# Patient Record
Sex: MALE | Race: BLACK OR AFRICAN AMERICAN | NOT HISPANIC OR LATINO | Employment: UNEMPLOYED | URBAN - METROPOLITAN AREA
[De-identification: names, ages, dates, MRNs, and addresses within clinical notes are randomized per-mention and may not be internally consistent; named-entity substitution may affect disease eponyms.]

---

## 2022-05-31 ENCOUNTER — OFFICE VISIT (OUTPATIENT)
Dept: PODIATRY | Facility: CLINIC | Age: 42
End: 2022-05-31
Payer: MEDICARE

## 2022-05-31 VITALS — SYSTOLIC BLOOD PRESSURE: 124 MMHG | HEART RATE: 70 BPM | HEIGHT: 71 IN | DIASTOLIC BLOOD PRESSURE: 84 MMHG

## 2022-05-31 DIAGNOSIS — B35.3 TINEA PEDIS OF BOTH FEET: ICD-10-CM

## 2022-05-31 DIAGNOSIS — M72.2 PLANTAR FASCIITIS: Primary | ICD-10-CM

## 2022-05-31 DIAGNOSIS — B35.1 ONYCHOMYCOSIS: ICD-10-CM

## 2022-05-31 PROCEDURE — 99203 OFFICE O/P NEW LOW 30 MIN: CPT | Performed by: PODIATRIST

## 2022-05-31 RX ORDER — HYDROCHLOROTHIAZIDE 12.5 MG/1
12.5 TABLET ORAL DAILY
COMMUNITY
Start: 2022-05-21

## 2022-05-31 NOTE — PROGRESS NOTES
PATIENT:  Dania Coleman  1980       ASSESSMENT:     1  Plantar fasciitis  Ambulatory referral to Physical Therapy    P F  Night Splint   2  Tinea pedis of both feet  econazole nitrate 1 % cream   3  Onychomycosis               PLAN:  1  Reviewed medical records and labs  Patient was counseled and educated on the condition and the diagnosis  2  The exam and symptoms are consistent with right plantar fasciitis  He also has mild onychomycosis and tinea pedis  The diagnosis, treatment options and prognosis were discussed with the patient  3  Referred him to PT/OT  Sent him for night splint  4  Instructed supportive care, home exercise, icing, and proper footwear/ arch support  Discussed possible images depending on the progress  5  Continue Penlac for now  Econazole for tinea pedis  6  Patient will return in 2 months for re-evaluation  Subjective:       HPI  The patient presents for second opinion  He has nail fungus and athlete's foot  He had if for a few months  He is on Penlac since April  He was also instructed to use OTC spray of skin without resolving it  He also complained of pain in right heel for about 1 month  The patient has significant post-static dyskinesia in the morning  Pain also increases with walking and standing  The patient does not recall any injury, but reported some overuse  The patient tried OTC meds, and different shoes without relieving the pain  Denied any swelling  No associated numbness or paresthesia  No significant weakness  The following portions of the patient's history were reviewed and updated as appropriate: allergies, current medications, past family history, past medical history, past social history, past surgical history and problem list   All pertinent labs and images were reviewed        Past Medical History  Past Medical History:   Diagnosis Date    High blood pressure        Past Surgical History  Past Surgical History:   Procedure Laterality Date    BACK SURGERY      05/2022        Allergies:  Cat hair extract    Medications:  Current Outpatient Medications   Medication Sig Dispense Refill    ciclopirox (PENLAC) 8 % solution       econazole nitrate 1 % cream Apply topically daily 85 g 3    hydrochlorothiazide (HYDRODIURIL) 12 5 mg tablet Take 12 5 mg by mouth daily       No current facility-administered medications for this visit  Social History:  Social History     Socioeconomic History    Marital status: Single     Spouse name: None    Number of children: None    Years of education: None    Highest education level: None   Occupational History    None   Tobacco Use    Smoking status: Current Every Day Smoker     Packs/day: 0 25     Years: 10 00     Pack years: 2 50     Types: Cigarettes    Smokeless tobacco: Never Used   Vaping Use    Vaping Use: Never used   Substance and Sexual Activity    Alcohol use: Yes     Alcohol/week: 1 0 standard drink     Types: 1 Shots of liquor per week    Drug use: Yes     Types: Marijuana    Sexual activity: Yes     Partners: Female   Other Topics Concern    None   Social History Narrative    None     Social Determinants of Health     Financial Resource Strain: Not on file   Food Insecurity: Not on file   Transportation Needs: Not on file   Physical Activity: Not on file   Stress: Not on file   Social Connections: Not on file   Intimate Partner Violence: Not on file   Housing Stability: Not on file          Review of Systems   Constitutional: Negative for appetite change, chills and fever  HENT: Negative for sore throat  Respiratory: Negative for cough and shortness of breath  Cardiovascular: Negative for chest pain  Gastrointestinal: Negative for diarrhea, nausea and vomiting  Musculoskeletal: Negative for gait problem  Skin: Negative for rash and wound  Allergic/Immunologic: Negative for immunocompromised state     Neurological: Negative for weakness and numbness  Hematological: Negative  Psychiatric/Behavioral: Negative for behavioral problems and confusion  Objective:      /84   Pulse 70   Ht 5' 11" (1 803 m) Comment: verbal         Physical Exam  Vitals reviewed  Constitutional:       General: He is not in acute distress  Appearance: He is not ill-appearing or toxic-appearing  HENT:      Head: Normocephalic and atraumatic  Eyes:      Extraocular Movements: Extraocular movements intact  Cardiovascular:      Rate and Rhythm: Normal rate and regular rhythm  Pulses: Normal pulses  Dorsalis pedis pulses are 2+ on the right side and 2+ on the left side  Posterior tibial pulses are 2+ on the right side and 2+ on the left side  Pulmonary:      Effort: Pulmonary effort is normal  No respiratory distress  Musculoskeletal:         General: Tenderness present  No swelling or signs of injury  Cervical back: Normal range of motion and neck supple  Right lower leg: No edema  Left lower leg: No edema  Right foot: No foot drop  Left foot: No foot drop  Comments: Focal pain right plantar medial heel  No edema  No pain on lateral compression of right heel  Skin:     General: Skin is warm  Capillary Refill: Capillary refill takes less than 2 seconds  Coloration: Skin is not cyanotic or mottled  Findings: No abscess, ecchymosis or wound  Nails: There is no clubbing  Comments: Mild discoloration of distal toenails great toes  Mild skin peeling around toes  Maceration 3rd and 4th interspace bilaterally  Neurological:      General: No focal deficit present  Mental Status: He is alert and oriented to person, place, and time  Cranial Nerves: No cranial nerve deficit  Sensory: No sensory deficit  Motor: No weakness        Coordination: Coordination normal    Psychiatric:         Mood and Affect: Mood normal          Behavior: Behavior normal          Thought Content:  Thought content normal          Judgment: Judgment normal

## 2022-05-31 NOTE — PATIENT INSTRUCTIONS
Plantar Fasciitis Exercises   AMBULATORY CARE:   What you need to know about plantar fasciitis exercises:  Plantar fasciitis exercises help stretch your plantar fascia, calf muscles, and Achilles tendon  They also help strengthen the muscles that support your heel and foot  Exercises and stretching can help prevent plantar fasciitis from getting worse or coming back  Contact your healthcare provider if:   Your pain and swelling increase  You develop new knee, hip, or back pain  You have questions or concerns about your condition or care  How to do plantar fasciitis exercises:  Ask your healthcare provider when to start these exercises and how often to do them  Slant board stretch:  Stand on a slanted board with your toes higher than your heel  Press your heel into the board  Keep your knee slightly bent  Hold this position for 1 minute  Repeat 5 times  Heel stretch:  Stand up straight with your hands on a wall  Place your injured leg slightly behind your other leg  Keep your heels flat on the floor, lean forward, and bend both knees  Hold for 30 seconds  Calf stretch:  Stand up straight with your hands on a wall  Step forward so that your uninjured foot is in front of your injured foot  Keep your front leg bent and your back leg straight  Gently lean forward until you feel your calf stretch  Hold for 30 seconds and then relax  Seated plantar fascia stretch:  Sit on a firm surface, such as the floor or a mat  Extend your legs out in front of you  Raise your injured foot a few inches off the ground  Keep your leg straight  Grab the toes of your injured foot and pull them toward you  With your other hand, feel your plantar fascia  You should feel it press outward  Hold for 30 seconds  If you cannot reach your toes, loop a towel or tie around your foot  Gently pull on the towel or tie and flex your toes toward you  Heel raises:  Stand on the injured leg   Raise your other leg off the ground  Hold onto a railing or wall for balance  Slowly rise up on the toes of your injured leg  Hold for 5 seconds  Slowly lower your heel to the ground  Toe curls:  Place a towel on the floor  Put your foot flat on the towel  Grab the towel with your toes by curling them around the towel  Lift the towel up with your toes  Toe taps:  Sit down and place your foot flat on the floor  Keep your heel on the floor  Point all your toes up toward the ceiling  While the 4 smaller toes are pointed up, bend your big toe down and tap it on the ground  Do 10 to 50 taps  Point all 5 toes up toward the ceiling again  This time keep your big toe pointed up and tap the 4 smaller toes on the ground  Do 10 to 50 taps each time  Follow up with your doctor as directed:  Write down your questions so you remember to ask them during your visits  © Copyright [employer] 2022 Information is for End User's use only and may not be sold, redistributed or otherwise used for commercial purposes  All illustrations and images included in CareNotes® are the copyrighted property of [employer]  or [facility] Som Hernandez   The above information is an  only  It is not intended as medical advice for individual conditions or treatments  Talk to your doctor, nurse or pharmacist before following any medical regimen to see if it is safe and effective for you

## 2022-06-13 ENCOUNTER — EVALUATION (OUTPATIENT)
Dept: PHYSICAL THERAPY | Facility: CLINIC | Age: 42
End: 2022-06-13
Payer: MEDICARE

## 2022-06-13 DIAGNOSIS — M72.2 PLANTAR FASCIITIS: ICD-10-CM

## 2022-06-13 PROCEDURE — 97162 PT EVAL MOD COMPLEX 30 MIN: CPT | Performed by: PHYSICAL THERAPIST

## 2022-06-13 NOTE — PROGRESS NOTES
PT Evaluation     Today's date: 2022  Patient name: Earlene Magana  : 1980  MRN: [id number]  Referring provider: Clolin Gilmore DPM  Dx:   Encounter Diagnosis     ICD-10-CM    1  Plantar fasciitis  M72 2 Ambulatory referral to Physical Therapy              Assessment  Assessment details: Pt is a 42y o  year old male presenting to physical therapy for Plantar fasciitis  He present with the following impairments; weakness in right ankle DF, TTP and pain in right medial calcaneous, mid-plantar aspect, and plantar fascia, pronation in left ankle and supination in right ankle during ambulation, pain with passive inversion, inability to heel walk due to pain in right ankle,  poor squatting mechanics, and pain with in heel during active movements affecting his function with walking, standing, jumping, cutting, planting, navigating stairs, squatting, heel raises, toe/heel walking, and single leg stance  Pt will benefit from skilled physical therapy to address functional limitations noted in evaluation and meet patient goals  Impairments: abnormal gait, abnormal or restricted ROM, impaired physical strength, lacks appropriate home exercise program, pain with function and weight-bearing intolerance    Goals  ST: Pt will be able to complete 17 heel raises in 2-4 weeks to show improved strength in right ankle  2: Pt will be able to walk 25 feet on heels to show improved tolerance and decreased pain in right heel in 2-4 weeks  LT: Pt will be able to complete 25 heel raises on his right ankle to show increased DF strength, to improve with jumping when playing basketball in 6-8   2: Pt will be able to complete a 2 hour basketball game with no pain in his right heel in 6-8 weeks     3: Pt will be I w HEP    Plan  Patient would benefit from: skilled physical therapy  Planned modality interventions: manual electrical stimulation, cryotherapy, microcurrent electrical stimulation, TENS, thermotherapy: hydrocollator packs, electrical stimulation/Mongolian stimulation and unattended electrical stimulation  Planned therapy interventions: joint mobilization, manual therapy, massage, Damon taping, ADL training, abdominal trunk stabilization, balance, balance/weight bearing training, neuromuscular re-education, muscle pump exercises, therapeutic exercise, therapeutic activities, strengthening, stretching, therapeutic training, home exercise program, graded exercise, graded activity, gait training, flexibility and functional ROM exercises  Frequency: 1x week  Duration in weeks: 6        Subjective Evaluation    History of Present Illness  Mechanism of injury: Pt reports pain in his right foot in his heel when standing  The pain started in his heel a month ago with No TRIXIE  Pt reports pain every time when he walks, stands, jumps, plants, cut, and navigating stairs and that the pain is never not there  Pt reports that pain is worse when he first wakes up  Massage makes it feel better, but takes no medications or heat/ ice for the pain  Never radiates up or has LBP  Pt reports that his biggest goal is getting back to playing basketball and walking/standing w/out pain  Pt is currently not working  TTP on right heel which is the only place he reports pain  Pain  Current pain rating: 3  At best pain rating: 3  At worst pain rating: 10          Objective     Tenderness     Right Ankle/Foot   Tenderness in the medial calcaneus, mid-plantar aspect and plantar fascia  No tenderness in the anterior ankle, dorsum foot, lateral malleolus and medial malleolus       Active Range of Motion   Left Ankle/Foot   Dorsiflexion (ke): 18 degrees   Plantar flexion: 40 degrees   Inversion: 35 degrees   Eversion: 21 degrees     Right Ankle/Foot   Dorsiflexion (ke): 20 degrees   Plantar flexion: 37 degrees   Inversion: 40 degrees   Eversion: 25 degrees     Passive Range of Motion     Right Ankle/Foot    Eversion: WFL and with pain    Strength/Myotome Testing     Left Ankle/Foot   Dorsiflexion: 5  Plantar flexion: 5  Inversion: 5  Eversion: 5    Right Ankle/Foot   Dorsiflexion: 5  Plantar flexion: 5  Inversion: 4+  Eversion: 5    Additional Strength Details  Left foot pronated and right foot supinated during ambulation  Toe walking on R LE is weak and painful  Unable to heel walk due to pain in R heel  30secs b/l SLS EO    25 SL calf raises on L  10 SL calf raises on R     Evaluation supervised by Yecenia HOLGUIN         Precautions: N/A      Date 6/13            Visit # 1            FOTO 67/81             Re-eval IE              Manuals 6/13            Plantar Fascia IASTM PK            Gastroc Strech                                       Neuro Re-Ed 6/13            SLS             Biodex             Airex             Slantboard             LAQ             PT Education PK                         Ther Ex 6/13            Bike             Heel Raises 2x10            SL Heel Raises 2x10 ea            Leg Press             Calf Press             Gastroc Stre 3x30'            Ankle 3 Way 10x5' ea                                      Ther Activity 6/13            Sqatting             Lateral Walking             Gait Training 6/13                                      Modalities 6/13

## 2022-06-17 ENCOUNTER — APPOINTMENT (OUTPATIENT)
Dept: PHYSICAL THERAPY | Facility: CLINIC | Age: 42
End: 2022-06-17
Payer: MEDICARE

## 2022-06-24 ENCOUNTER — OFFICE VISIT (OUTPATIENT)
Dept: PHYSICAL THERAPY | Facility: CLINIC | Age: 42
End: 2022-06-24
Payer: MEDICARE

## 2022-06-24 DIAGNOSIS — M72.2 PLANTAR FASCIITIS: Primary | ICD-10-CM

## 2022-06-24 PROCEDURE — 97112 NEUROMUSCULAR REEDUCATION: CPT | Performed by: PHYSICAL THERAPIST

## 2022-06-24 PROCEDURE — 97110 THERAPEUTIC EXERCISES: CPT | Performed by: PHYSICAL THERAPIST

## 2022-06-24 NOTE — PROGRESS NOTES
Daily Note     Today's date: 2022  Patient name: Martinez Ponce  : 1980  MRN: [id number]  Referring provider: Raffi Pruitt DPM  Dx:   Encounter Diagnosis     ICD-10-CM    1  Plantar fasciitis  M72 2                   Subjective: Pt reports that his foot is doing better since last visit  Objective: See treatment diary below      Assessment: Pt required the use of 2 finger support on the bar for R SLS on airex at the mirror, but was able to maintain balance the whole time  He has improved tolerance to SLS w minimal pain present  Pt was educated on proper squatting form and depth by SPT, and was able to [facility] proper form throughout exercise  Pt performed leg press with relative ease and advanced from 105# to 155#, continue to advance weight as pt tolerates NV  Patient demonstrated fatigue post treatment, exhibited good technique with therapeutic exercises and would benefit from continued PT  Plan: Continue per plan of care  Progress treatment as tolerated  Treatment supervised by Brian HOLGIUN       Precautions: N/A      Date            Visit # 1 2           FOTO              Re-eval IE              Manuals            Plantar Fascia IASTM PK PK           Gastroc Strech                                       Neuro Re-Ed            SLS  3x30' Airex           Biodex             Airex             Slantboard  3x30'           LAQ             PT Education PK            Rockerboard  30x A/P + S/S           Ther Ex            Bike  10'           Heel Raises 2x10 25x on step           SL Heel Raises 2x10 ea 20x ea           Leg Press  10x 105# 125# 155#/ RSL #75           Calf Press  20x 55# RSL           Gastroc Stre 3x30'            Ankle 3 Way 10x5' ea 15x ea GTB           Tennis [place]  2'                        Ther Activity            Sqatting  2x10           Lateral Walking             Gait Training  Modalities 6/13

## 2022-07-01 ENCOUNTER — OFFICE VISIT (OUTPATIENT)
Dept: PHYSICAL THERAPY | Facility: CLINIC | Age: 42
End: 2022-07-01
Payer: MEDICARE

## 2022-07-01 DIAGNOSIS — M72.2 PLANTAR FASCIITIS: Primary | ICD-10-CM

## 2022-07-01 PROCEDURE — 97110 THERAPEUTIC EXERCISES: CPT | Performed by: PHYSICAL THERAPIST

## 2022-07-01 PROCEDURE — 97112 NEUROMUSCULAR REEDUCATION: CPT | Performed by: PHYSICAL THERAPIST

## 2022-07-01 NOTE — PROGRESS NOTES
Daily Note     Today's date: 2022  Patient name: Tone Kinsey  : 1980  MRN: [id number]  Referring provider: Lucie Gomes DPM  Dx:   Encounter Diagnosis     ICD-10-CM    1  Plantar fasciitis  M72 2                   Subjective: Pt reports that his foot is doing better since last visit  Objective: See treatment diary below      Assessment: Pt was able to balance himself on airex with SLS for the whole time without the use of UE, except for 1 time in the 3 sets to catch himself  Pt completed 165# on leg press with good form and relative ease, advance to 185# NV  Pt had more tightness in his more distal medial portion of his right plantar fasicia today than last visit, but after IASTM was performed it was alleviated  Patient demonstrated fatigue post treatment, exhibited good technique with therapeutic exercises and would benefit from continued PT      Plan: Continue per plan of care  Progress treatment as tolerated         Precautions: N/A      Date           Visit # 1 2 3          FOTO              Re-eval IE              Manuals           Plantar Fascia IASTM PK PK PK          Gastroc Strech                                       Neuro Re-Ed           SLS  3x30' Airex 3x30' Airex          Biodex             Airex             Slantboard  3x30' 3x30'          LAQ   30x 4#          PT Education PK  PK          Rockerboard  30x A/P + S/S 30x A/P + S/S          Ther Ex           Bike  10' 10'          Heel Raises 2x10 25x on step 30x 1R          SL Heel Raises 2x10 ea 20x ea 25x ea          Leg Press  10x 105# 125# 155#/ RSL #75 30x 165#/ 75# RSL          Calf Press  20x 55# RSL 30x 55# RSL          Gastroc Stre 3x30'            Ankle 3 Way 10x5' ea 15x ea GTB 30x ea BTB          Tennis [place]  2' 2'                       Ther Activity           Sqatting  2x10 2x10          Lateral Walking             Gait Training  Modalities 6/13

## 2022-07-08 ENCOUNTER — OFFICE VISIT (OUTPATIENT)
Dept: PHYSICAL THERAPY | Facility: CLINIC | Age: 42
End: 2022-07-08
Payer: MEDICARE

## 2022-07-08 DIAGNOSIS — M72.2 PLANTAR FASCIITIS: Primary | ICD-10-CM

## 2022-07-08 PROCEDURE — 97112 NEUROMUSCULAR REEDUCATION: CPT | Performed by: PHYSICAL THERAPIST

## 2022-07-08 PROCEDURE — 97110 THERAPEUTIC EXERCISES: CPT | Performed by: PHYSICAL THERAPIST

## 2022-07-08 NOTE — PROGRESS NOTES
Discharge Note     Today's date: 2022  Patient name: Fernando Loving  : 1980  MRN: [id number]  Referring provider: Dayna Salazar DPM  Dx:   Encounter Diagnosis     ICD-10-CM    1  Plantar fasciitis  M72 2                   Subjective: Pt reports no pain in the past few days  Objective: See treatment diary below      Assessment: Pt does well w current program and is challenged w calf strengthening activities and rockerboard  He has some TTP along medial calcaneal tubercle w IASTM  Patient is I w HEP and is being [address] w HEP  Plan: DC w HEP       Precautions: N/A      Date          Visit # 1 2 3 4         FOTO 67   80/81          Re-eval IE              Manuals          Plantar Fascia IASTM PK PK PK SF         Gastroc Strech                                       Neuro Re-Ed  7         SLS  3x30' Airex 3x30' Airex 3x30' Airex         Biodex             Airex             Slantboard  3x30' 3x30' 3x30"         LAQ   30x 4#          PT Education PK  PK SF         Rockerboard  30x A/P + S/S 30x A/P + S/S 30x A/P SL         Ther Ex          Bike  10' 10' 8'         Heel Raises 2x10 25x on step 30x 1R 30x 1R SL         SL Heel Raises 2x10 ea 20x ea 25x ea          Leg Press  10x 105# 125# 155#/ RSL #75 30x 165#/ 75# RSL 30x 165#/ 75# RSL         Calf Press  20x 55# RSL 30x 55# RSL 30x 55# RSL         Gastroc Stre 3x30'   3x30"         Ankle 3 Way 10x5' ea 15x ea GTB 30x ea BTB          Tennis [place]  2' 2'                       Ther Activity  7         Sqatting  2x10 2x10          Lateral Walking             Gait Training                                       Modalities

## 2022-09-06 ENCOUNTER — OFFICE VISIT (OUTPATIENT)
Dept: PODIATRY | Facility: CLINIC | Age: 42
End: 2022-09-06
Payer: MEDICARE

## 2022-09-06 VITALS
HEIGHT: 71 IN | HEART RATE: 54 BPM | DIASTOLIC BLOOD PRESSURE: 82 MMHG | WEIGHT: 237.8 LBS | SYSTOLIC BLOOD PRESSURE: 132 MMHG | BODY MASS INDEX: 33.29 KG/M2

## 2022-09-06 DIAGNOSIS — B35.1 ONYCHOMYCOSIS: ICD-10-CM

## 2022-09-06 DIAGNOSIS — B35.3 TINEA PEDIS OF BOTH FEET: ICD-10-CM

## 2022-09-06 DIAGNOSIS — M72.2 PLANTAR FASCIITIS: Primary | ICD-10-CM

## 2022-09-06 PROCEDURE — 99213 OFFICE O/P EST LOW 20 MIN: CPT | Performed by: PODIATRIST

## 2022-09-06 NOTE — PROGRESS NOTES
PATIENT:  Julianne Mary  1980       ASSESSMENT:     1  Plantar fasciitis     2  Tinea pedis of both feet     3  Onychomycosis           PLAN:  1  Reviewed medical records  Reviewed the notes from PT  Patient was counseled and educated on the condition and the diagnosis  2  The diagnosis, treatment options and prognosis were discussed with the patient  3  He is doing well and instructed supportive care, home exercise, icing, and proper footwear/ arch support  4  Continue Penlac  Econazole when needed for tinea pedis  5  Consider injection if his pain gets worse in the future  Subjective:       HPI  The patient presents for evaluation of heel pain and fungus infection  Heel pain is much better  Pain is about 2 out of 10 when it bothers him  No swelling  Skin looks better with antifungal cream   He is on Penlac  No new complaint  The following portions of the patient's history were reviewed and updated as appropriate: allergies, current medications, past family history, past medical history, past social history, past surgical history and problem list   All pertinent labs and images were reviewed  Past Medical History  Past Medical History:   Diagnosis Date    High blood pressure        Past Surgical History  Past Surgical History:   Procedure Laterality Date    BACK SURGERY      05/2022        Allergies:  Cat hair extract    Medications:  Current Outpatient Medications   Medication Sig Dispense Refill    ciclopirox (PENLAC) 8 % solution       econazole nitrate 1 % cream Apply topically daily 85 g 3    hydrochlorothiazide (HYDRODIURIL) 12 5 mg tablet Take 12 5 mg by mouth daily       No current facility-administered medications for this visit         Social History:  Social History     Socioeconomic History    Marital status: Single     Spouse name: None    Number of children: None    Years of education: None    Highest education level: None Occupational History    None   Tobacco Use    Smoking status: Current Every Day Smoker     Packs/day: 0 25     Years: 10 00     Pack years: 2 50     Types: Cigarettes    Smokeless tobacco: Never Used   Vaping Use    Vaping Use: Never used   Substance and Sexual Activity    Alcohol use: Yes     Alcohol/week: 1 0 standard drink     Types: 1 Shots of liquor per week    Drug use: Yes     Types: Marijuana    Sexual activity: Yes     Partners: Female   Other Topics Concern    None   Social History Narrative    None     Social Determinants of Health     Financial Resource Strain: Not on file   Food Insecurity: Not on file   Transportation Needs: Not on file   Physical Activity: Not on file   Stress: Not on file   Social Connections: Not on file   Intimate Partner Violence: Not on file   Housing Stability: Not on file          Review of Systems   Constitutional: Negative for appetite change, chills and fever  Respiratory: Negative for cough and shortness of breath  Cardiovascular: Negative for chest pain  Gastrointestinal: Negative for diarrhea, nausea and vomiting  Musculoskeletal: Negative for gait problem  Neurological: Negative for numbness  Objective:      /82   Pulse (!) 54   Ht 5' 11" (1 803 m) Comment: verbal  Wt 108 kg (237 lb 12 8 oz)   BMI 33 17 kg/m²          Physical Exam  Vitals reviewed  Constitutional:       General: He is not in acute distress  Appearance: He is not ill-appearing or toxic-appearing  Cardiovascular:      Rate and Rhythm: Normal rate and regular rhythm  Pulses: Normal pulses  Dorsalis pedis pulses are 2+ on the right side and 2+ on the left side  Posterior tibial pulses are 2+ on the right side and 2+ on the left side  Pulmonary:      Effort: Pulmonary effort is normal  No respiratory distress  Musculoskeletal:         General: No swelling or signs of injury  Right lower leg: No edema        Left lower leg: No edema       Right foot: No foot drop  Left foot: No foot drop  Comments: Minimal pain right plantar medial heel  No edema  Skin:     General: Skin is warm  Capillary Refill: Capillary refill takes less than 2 seconds  Coloration: Skin is not cyanotic or mottled  Findings: No abscess, ecchymosis or wound  Nails: There is no clubbing  Comments: Mild discoloration of distal toenails great toes  Maceration 3rd and 4th interspace resolved  Neurological:      General: No focal deficit present  Mental Status: He is alert and oriented to person, place, and time  Cranial Nerves: No cranial nerve deficit  Sensory: No sensory deficit  Motor: No weakness  Coordination: Coordination normal    Psychiatric:         Mood and Affect: Mood normal          Behavior: Behavior normal          Thought Content:  Thought content normal          Judgment: Judgment normal

## 2022-12-22 DIAGNOSIS — B35.3 TINEA PEDIS OF BOTH FEET: ICD-10-CM
